# Patient Record
Sex: MALE | Race: WHITE
[De-identification: names, ages, dates, MRNs, and addresses within clinical notes are randomized per-mention and may not be internally consistent; named-entity substitution may affect disease eponyms.]

---

## 2017-01-03 ENCOUNTER — HOSPITAL ENCOUNTER (INPATIENT)
Dept: HOSPITAL 47 - 3MHU | Age: 57
LOS: 15 days | Discharge: HOME | DRG: 885 | End: 2017-01-18
Payer: MEDICARE

## 2017-01-03 VITALS — BODY MASS INDEX: 27.3 KG/M2

## 2017-01-03 DIAGNOSIS — F17.210: ICD-10-CM

## 2017-01-03 DIAGNOSIS — B19.20: ICD-10-CM

## 2017-01-03 DIAGNOSIS — F10.10: ICD-10-CM

## 2017-01-03 DIAGNOSIS — F90.2: ICD-10-CM

## 2017-01-03 DIAGNOSIS — M54.12: ICD-10-CM

## 2017-01-03 DIAGNOSIS — G89.29: ICD-10-CM

## 2017-01-03 DIAGNOSIS — F41.9: ICD-10-CM

## 2017-01-03 DIAGNOSIS — F31.10: Primary | ICD-10-CM

## 2017-01-03 DIAGNOSIS — Z79.899: ICD-10-CM

## 2017-01-03 DIAGNOSIS — F43.10: ICD-10-CM

## 2017-01-03 DIAGNOSIS — F12.20: ICD-10-CM

## 2017-01-03 DIAGNOSIS — Z91.14: ICD-10-CM

## 2017-01-03 DIAGNOSIS — G25.71: ICD-10-CM

## 2017-01-03 DIAGNOSIS — M54.9: ICD-10-CM

## 2017-01-03 DIAGNOSIS — G47.00: ICD-10-CM

## 2017-01-03 PROCEDURE — 85025 COMPLETE CBC W/AUTO DIFF WBC: CPT

## 2017-01-03 PROCEDURE — 80053 COMPREHEN METABOLIC PANEL: CPT

## 2017-01-03 PROCEDURE — 84443 ASSAY THYROID STIM HORMONE: CPT

## 2017-01-03 PROCEDURE — 81001 URINALYSIS AUTO W/SCOPE: CPT

## 2017-01-03 PROCEDURE — 80306 DRUG TEST PRSMV INSTRMNT: CPT

## 2017-01-03 PROCEDURE — 80178 ASSAY OF LITHIUM: CPT

## 2017-01-03 PROCEDURE — 93005 ELECTROCARDIOGRAM TRACING: CPT

## 2017-01-03 PROCEDURE — 94640 AIRWAY INHALATION TREATMENT: CPT

## 2017-01-03 RX ADMIN — NICOTINE SCH: 14 PATCH, EXTENDED RELEASE TRANSDERMAL at 22:42

## 2017-01-04 RX ADMIN — NICOTINE SCH PATCH: 14 PATCH, EXTENDED RELEASE TRANSDERMAL at 08:37

## 2017-01-04 RX ADMIN — ZIPRASIDONE HCL SCH MG: 20 CAPSULE ORAL at 17:30

## 2017-01-04 RX ADMIN — ALBUTEROL SULFATE PRN PUFF: 90 AEROSOL, METERED RESPIRATORY (INHALATION) at 18:56

## 2017-01-04 RX ADMIN — ACETAMINOPHEN PRN MG: 325 TABLET, FILM COATED ORAL at 20:53

## 2017-01-04 RX ADMIN — NICOTINE SCH: 14 PATCH, EXTENDED RELEASE TRANSDERMAL at 08:35

## 2017-01-04 RX ADMIN — ACETAMINOPHEN PRN MG: 325 TABLET, FILM COATED ORAL at 04:54

## 2017-01-04 RX ADMIN — HYDROCODONE BITARTRATE AND ACETAMINOPHEN PRN EACH: 7.5; 325 TABLET ORAL at 16:37
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
## 2017-01-04 NOTE — HP
HISTORY OF PRESENT ILLNESS: This is a 56-year-old  male who 

presented to the emergency department with worsening depression. 

Patient current is denying chest pain, shortness of breath, nausea, 

vomiting, abdominal pain, dizziness, (      ) or blurry vision. 



REVIEW OF SYSTEMS: All 14 systems reviewed and negative except as above.



ALLERGIES: PENICILLIN GIVES HIM A RASH.



HOME MEDICATIONS: 

1. Zetia 10 mg twice daily.

2. Marijuana 2 g daily.



SOCIAL HISTORY: The patient smokes 1 to 1-1/2 packs per day, drinks 

alcohol occasionally. Denied history of drug abuse, but stated that he 

smokes medical marijuana for his back pain.  



PAST MEDICAL HISTORY: Chronic back pain, cervical radiculopathy.



PAST SURGICAL HISTORY: Laminectomy of L5-S1 and subsequent in her 

cervical spine. 



FAMILY HISTORY: Positive for diabetes in his father, mother, brother 

and sisters. 



PHYSICAL EXAMINATION:

VITAL SIGNS: Reviewed and stable.

LUNGS: Clear to auscultation bilaterally.

HEART: S1 and S2.

HEENT: Atraumatic, normocephalic. PERRLA.

NECK: Supple. No masses. No thyromegaly.

ABDOMEN: Soft, nontender. Positive bowel sounds in all 4 quadrants. 

LOWER EXTREMITIES: No edema.

PSYCH: Alert and oriented x3.

NEURO: No focal deficit.



IMAGING AND LABS: Reviewed.



ASSESSMENT AND PLAN:

1. Acute depression with anxiety. Medications per your recommendation.

2. Chronic back pain and chronic neck pain. Patient said that when he 

is off medical marijuana, he is to be on Vicodin or equivalent. I 

would like to start the patient on Norco 7.5 mg twice daily as needed. 

3. Tobacco dependency. The patient counseled regarding smoking 

cessation and need for him to quit. 

4. Bipolar disorder per your recommendation.

## 2017-01-05 LAB
ALP SERPL-CCNC: 83 U/L (ref 38–126)
ALT SERPL-CCNC: 53 U/L (ref 21–72)
ANION GAP SERPL CALC-SCNC: 11 MMOL/L
AST SERPL-CCNC: 40 U/L (ref 17–59)
BASOPHILS # BLD AUTO: 0 K/UL (ref 0–0.2)
BASOPHILS NFR BLD AUTO: 1 %
BUN SERPL-SCNC: 12 MG/DL (ref 9–20)
CALCIUM SPEC-MCNC: 9.1 MG/DL (ref 8.4–10.2)
CH: 32.9
CHCM: 33.3
CHLORIDE SERPL-SCNC: 106 MMOL/L (ref 98–107)
CO2 SERPL-SCNC: 26 MMOL/L (ref 22–30)
EOSINOPHIL # BLD AUTO: 0.2 K/UL (ref 0–0.7)
EOSINOPHIL NFR BLD AUTO: 2 %
ERYTHROCYTE [DISTWIDTH] IN BLOOD BY AUTOMATED COUNT: 4.48 M/UL (ref 4.3–5.9)
ERYTHROCYTE [DISTWIDTH] IN BLOOD: 12.4 % (ref 11.5–15.5)
GLUCOSE SERPL-MCNC: 127 MG/DL (ref 74–99)
HCT VFR BLD AUTO: 44.4 % (ref 39–53)
HDW: 2.31
HGB BLD-MCNC: 14.4 GM/DL (ref 13–17.5)
LUC NFR BLD AUTO: 2 %
LYMPHOCYTES # SPEC AUTO: 1.5 K/UL (ref 1–4.8)
LYMPHOCYTES NFR SPEC AUTO: 21 %
MCH RBC QN AUTO: 32.1 PG (ref 25–35)
MCHC RBC AUTO-ENTMCNC: 32.3 G/DL (ref 31–37)
MCV RBC AUTO: 99.3 FL (ref 80–100)
MONOCYTES # BLD AUTO: 0.4 K/UL (ref 0–1)
MONOCYTES NFR BLD AUTO: 6 %
NEUTROPHILS # BLD AUTO: 4.9 K/UL (ref 1.3–7.7)
NEUTROPHILS NFR BLD AUTO: 68 %
NON-AFRICAN AMERICAN GFR(MDRD): >60
POTASSIUM SERPL-SCNC: 4.2 MMOL/L (ref 3.5–5.1)
PROT SERPL-MCNC: 6.6 G/DL (ref 6.3–8.2)
SODIUM SERPL-SCNC: 143 MMOL/L (ref 137–145)
WBC # BLD AUTO: 0.13 10*3/UL
WBC # BLD AUTO: 7.2 K/UL (ref 3.8–10.6)
WBC (PEROX): 7.67

## 2017-01-05 RX ADMIN — ZIPRASIDONE HCL SCH MG: 20 CAPSULE ORAL at 07:54

## 2017-01-05 RX ADMIN — NICOTINE SCH PATCH: 14 PATCH, EXTENDED RELEASE TRANSDERMAL at 08:04

## 2017-01-05 RX ADMIN — ALBUTEROL SULFATE PRN PUFF: 90 AEROSOL, METERED RESPIRATORY (INHALATION) at 16:33

## 2017-01-05 RX ADMIN — ALBUTEROL SULFATE PRN PUFF: 90 AEROSOL, METERED RESPIRATORY (INHALATION) at 21:22

## 2017-01-05 RX ADMIN — HYDROCODONE BITARTRATE AND ACETAMINOPHEN PRN EACH: 7.5; 325 TABLET ORAL at 21:07

## 2017-01-05 RX ADMIN — HYDROCODONE BITARTRATE AND ACETAMINOPHEN PRN EACH: 7.5; 325 TABLET ORAL at 05:00

## 2017-01-05 RX ADMIN — ZIPRASIDONE HCL SCH MG: 20 CAPSULE ORAL at 17:49

## 2017-01-05 RX ADMIN — ALBUTEROL SULFATE PRN PUFF: 90 AEROSOL, METERED RESPIRATORY (INHALATION) at 13:10

## 2017-01-05 RX ADMIN — ACETAMINOPHEN PRN MG: 325 TABLET, FILM COATED ORAL at 16:27

## 2017-01-05 RX ADMIN — BENZTROPINE MESYLATE PRN MG: 1 TABLET ORAL at 15:37
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
## 2017-01-06 RX ADMIN — ACETAMINOPHEN PRN MG: 325 TABLET, FILM COATED ORAL at 17:55

## 2017-01-06 RX ADMIN — BENZTROPINE MESYLATE PRN MG: 1 TABLET ORAL at 18:54

## 2017-01-06 RX ADMIN — ALBUTEROL SULFATE PRN PUFF: 90 AEROSOL, METERED RESPIRATORY (INHALATION) at 15:17

## 2017-01-06 RX ADMIN — ZIPRASIDONE HCL SCH MG: 20 CAPSULE ORAL at 08:51

## 2017-01-06 RX ADMIN — PALIPERIDONE SCH MG: 3 TABLET, EXTENDED RELEASE ORAL at 12:26

## 2017-01-06 RX ADMIN — ALBUTEROL SULFATE PRN PUFF: 90 AEROSOL, METERED RESPIRATORY (INHALATION) at 19:39

## 2017-01-06 RX ADMIN — NICOTINE SCH PATCH: 14 PATCH, EXTENDED RELEASE TRANSDERMAL at 08:51

## 2017-01-06 RX ADMIN — HYDROCODONE BITARTRATE AND ACETAMINOPHEN PRN EACH: 7.5; 325 TABLET ORAL at 21:01

## 2017-01-06 RX ADMIN — ALBUTEROL SULFATE PRN PUFF: 90 AEROSOL, METERED RESPIRATORY (INHALATION) at 09:36

## 2017-01-06 RX ADMIN — HYDROCODONE BITARTRATE AND ACETAMINOPHEN PRN EACH: 7.5; 325 TABLET ORAL at 14:55

## 2017-01-06 RX ADMIN — ACETAMINOPHEN PRN MG: 325 TABLET, FILM COATED ORAL at 23:35
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
## 2017-01-07 RX ADMIN — PALIPERIDONE SCH MG: 3 TABLET, EXTENDED RELEASE ORAL at 09:14

## 2017-01-07 RX ADMIN — HYDROCODONE BITARTRATE AND ACETAMINOPHEN PRN EACH: 7.5; 325 TABLET ORAL at 11:30

## 2017-01-07 RX ADMIN — ACETAMINOPHEN PRN MG: 325 TABLET, FILM COATED ORAL at 22:33

## 2017-01-07 RX ADMIN — NICOTINE SCH PATCH: 14 PATCH, EXTENDED RELEASE TRANSDERMAL at 09:14

## 2017-01-07 RX ADMIN — ALBUTEROL SULFATE PRN PUFF: 90 AEROSOL, METERED RESPIRATORY (INHALATION) at 12:12

## 2017-01-07 RX ADMIN — ALBUTEROL SULFATE PRN PUFF: 90 AEROSOL, METERED RESPIRATORY (INHALATION) at 08:49

## 2017-01-07 RX ADMIN — ALBUTEROL SULFATE PRN PUFF: 90 AEROSOL, METERED RESPIRATORY (INHALATION) at 17:12
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
## 2017-01-08 RX ADMIN — ALBUTEROL SULFATE PRN PUFF: 90 AEROSOL, METERED RESPIRATORY (INHALATION) at 17:03

## 2017-01-08 RX ADMIN — PALIPERIDONE SCH MG: 3 TABLET, EXTENDED RELEASE ORAL at 09:16

## 2017-01-08 RX ADMIN — ALBUTEROL SULFATE PRN PUFF: 90 AEROSOL, METERED RESPIRATORY (INHALATION) at 21:21

## 2017-01-08 RX ADMIN — ALBUTEROL SULFATE PRN PUFF: 90 AEROSOL, METERED RESPIRATORY (INHALATION) at 09:31

## 2017-01-08 RX ADMIN — HYDROCODONE BITARTRATE AND ACETAMINOPHEN PRN EACH: 7.5; 325 TABLET ORAL at 01:28

## 2017-01-08 RX ADMIN — ACETAMINOPHEN PRN MG: 325 TABLET, FILM COATED ORAL at 09:28

## 2017-01-08 RX ADMIN — HYDROCODONE BITARTRATE AND ACETAMINOPHEN PRN EACH: 7.5; 325 TABLET ORAL at 21:49

## 2017-01-08 RX ADMIN — NICOTINE SCH PATCH: 14 PATCH, EXTENDED RELEASE TRANSDERMAL at 09:15

## 2017-01-08 RX ADMIN — HYDROCODONE BITARTRATE AND ACETAMINOPHEN PRN EACH: 7.5; 325 TABLET ORAL at 10:54

## 2017-01-08 RX ADMIN — ACETAMINOPHEN PRN MG: 325 TABLET, FILM COATED ORAL at 14:42
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
## 2017-01-09 RX ADMIN — HYDROCODONE BITARTRATE AND ACETAMINOPHEN PRN EACH: 7.5; 325 TABLET ORAL at 20:53

## 2017-01-09 RX ADMIN — NICOTINE SCH PATCH: 14 PATCH, EXTENDED RELEASE TRANSDERMAL at 09:09

## 2017-01-09 RX ADMIN — PALIPERIDONE SCH MG: 3 TABLET, EXTENDED RELEASE ORAL at 09:09

## 2017-01-09 RX ADMIN — ALBUTEROL SULFATE PRN PUFF: 90 AEROSOL, METERED RESPIRATORY (INHALATION) at 17:08

## 2017-01-09 RX ADMIN — ALBUTEROL SULFATE PRN PUFF: 90 AEROSOL, METERED RESPIRATORY (INHALATION) at 09:23

## 2017-01-09 RX ADMIN — ALBUTEROL SULFATE PRN PUFF: 90 AEROSOL, METERED RESPIRATORY (INHALATION) at 21:19

## 2017-01-09 RX ADMIN — ALBUTEROL SULFATE PRN PUFF: 90 AEROSOL, METERED RESPIRATORY (INHALATION) at 13:54

## 2017-01-09 RX ADMIN — BENZTROPINE MESYLATE PRN MG: 1 TABLET ORAL at 03:59

## 2017-01-09 RX ADMIN — ACETAMINOPHEN PRN MG: 325 TABLET, FILM COATED ORAL at 06:26

## 2017-01-09 RX ADMIN — HYDROCODONE BITARTRATE AND ACETAMINOPHEN PRN EACH: 7.5; 325 TABLET ORAL at 09:09
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
## 2017-01-10 LAB
PARTICLE COUNT: 1194
PH UR: 6 [PH] (ref 5–8)
RBC UR QL: 7 /HPF (ref 0–5)
SP GR UR: 1.02 (ref 1–1.03)
SQUAMOUS UR QL AUTO: <1 /HPF (ref 0–4)
UA BILLING (MACRO VS. MICRO): (no result)
UROBILINOGEN UR QL STRIP: <2 MG/DL (ref ?–2)
WBC #/AREA URNS HPF: 1 /HPF (ref 0–5)

## 2017-01-10 RX ADMIN — PALIPERIDONE SCH MG: 6 TABLET, EXTENDED RELEASE ORAL at 09:28

## 2017-01-10 RX ADMIN — HYDROCODONE BITARTRATE AND ACETAMINOPHEN PRN EACH: 7.5; 325 TABLET ORAL at 09:28

## 2017-01-10 RX ADMIN — ALBUTEROL SULFATE PRN PUFF: 90 AEROSOL, METERED RESPIRATORY (INHALATION) at 08:57

## 2017-01-10 RX ADMIN — ALBUTEROL SULFATE PRN PUFF: 90 AEROSOL, METERED RESPIRATORY (INHALATION) at 20:46

## 2017-01-10 RX ADMIN — ALBUTEROL SULFATE PRN PUFF: 90 AEROSOL, METERED RESPIRATORY (INHALATION) at 16:55

## 2017-01-10 RX ADMIN — HYDROCODONE BITARTRATE AND ACETAMINOPHEN PRN EACH: 7.5; 325 TABLET ORAL at 17:10

## 2017-01-10 RX ADMIN — NICOTINE SCH PATCH: 14 PATCH, EXTENDED RELEASE TRANSDERMAL at 09:28

## 2017-01-10 RX ADMIN — ACETAMINOPHEN PRN MG: 325 TABLET, FILM COATED ORAL at 06:57

## 2017-01-10 RX ADMIN — ALBUTEROL SULFATE PRN PUFF: 90 AEROSOL, METERED RESPIRATORY (INHALATION) at 12:22

## 2017-01-10 RX ADMIN — ACETAMINOPHEN PRN MG: 325 TABLET, FILM COATED ORAL at 16:06

## 2017-01-10 NOTE — P.PN
Progress Note - Text


CLINICAL PROBLEMS: He was without complaint or concern.  He denied side effects 

to the increased dose of Invega.  Therapy staff reported that he is less 

hyperverbal and less restless during therapeutic groups and activities.





24 HOUR EVENTS:  He has participated in therapeutic groups and activities.  He 

posed no management problem and displayed no episodes of behavioral dyscontrol.

  





EXAMINATION:  He presented as casually groomed elderly  male with long 

hair and earrings.  He was pleasant on approach and maintained eye contact.  He 

appeared to attend to the interview.  He had a depressed facial expression.  He 

showed no abnormality of psychomotor activity.  Speech was spontaneous with 

normal rate, rhythm and volume.  His speech was not pressured and he displayed 

no flight of ideas.  His affect was depressed but stable and appropriate.  He 

denied suicidal ideation or death wishes.  He denied ideas of reference and did 

not express paranoid ideation.  His thinking was concrete and associations were 

coherent and logical.  He denied hallucinations did not appear to be responding 

to internal stimuli.





PERTINENT DATA: He is been compliant with prescribed medications.





ASSESSMENT:  He is much less agitated, labile and angry.  He showed a moderate 

response to treatment and no side effects to the increased dose of the Invega.





PLAN: Continue Invega 6 mg by mouth daily and continued discussion to position 

to long-acting injectable, encourage continued participation in therapeutic 

groups and activities, evaluate clinical status response to treatment on a 

daily basis.

## 2017-01-11 RX ADMIN — HYDROCODONE BITARTRATE AND ACETAMINOPHEN PRN EACH: 7.5; 325 TABLET ORAL at 09:04

## 2017-01-11 RX ADMIN — NICOTINE SCH: 14 PATCH, EXTENDED RELEASE TRANSDERMAL at 09:08

## 2017-01-11 RX ADMIN — PALIPERIDONE SCH MG: 6 TABLET, EXTENDED RELEASE ORAL at 09:01

## 2017-01-11 RX ADMIN — ALBUTEROL SULFATE PRN PUFF: 90 AEROSOL, METERED RESPIRATORY (INHALATION) at 12:42

## 2017-01-11 RX ADMIN — HYDROCODONE BITARTRATE AND ACETAMINOPHEN PRN EACH: 7.5; 325 TABLET ORAL at 16:38

## 2017-01-11 RX ADMIN — HYDROCODONE BITARTRATE AND ACETAMINOPHEN PRN EACH: 7.5; 325 TABLET ORAL at 21:01

## 2017-01-11 RX ADMIN — ALBUTEROL SULFATE PRN PUFF: 90 AEROSOL, METERED RESPIRATORY (INHALATION) at 09:23

## 2017-01-11 RX ADMIN — HYDROCODONE BITARTRATE AND ACETAMINOPHEN PRN EACH: 7.5; 325 TABLET ORAL at 00:39

## 2017-01-11 RX ADMIN — ALBUTEROL SULFATE PRN PUFF: 90 AEROSOL, METERED RESPIRATORY (INHALATION) at 21:36

## 2017-01-11 NOTE — P.PN
Progress Note - Text


CLINICAL PROBLEMS: He is a 56-year-old male admitted to the psychiatric unit 

involuntarily (on an ongoing treatment order) with recurrence of a manic 

psychosis.  He is complaining of back pain and requested a prescription of a 

"stronger" pain medicine than Narco.  He stated that he smokes marijuana home 

to control the pain and "unfortunately" does not have access to marijuana 

presently.  I explained that Narco is the only opiate pain medication is 

available on the psychiatric unit.  He declined my offer for a lidocaine patch.





24 HOUR EVENTS:  According to nursing reports she did not sleep last night.  

The therapist report that his he is much less labile and talkative than on 

admission but appears depressed.





EXAMINATION:  He presented as a somewhat disheveled appearing elderly  

male who was pleasant on approach.  He maintained eye contact and attended the 

interview.  Other than multiple tattoos he had no distinguishing features.  He 

had a blunted and depressed facial expression.  He was alert and oriented to 

person, place and time.  He did not show abnormality of psychomotor activity.  

He had a slow but steady gait.  His speech was spontaneous with normal rate, 

rhythm and volume.  His affect was depressed but not inappropriate or intense.  

He denied suicidal ideation or death wishes.  He denied homicidal ideation.  He 

denied depressive cognitions such as hopelessness, helplessness or 

worthlessness.  He denied obsessive about his ex-wife, his son and his daughter-

in-law.  He denied ideas of reference and did not express paranoid ideation.  

His thinking was concrete but his associations are coherent and logical.  He 

denied hallucinations and did not appear to be responding to internal stimuli





PERTINENT DATA: He again declined my recommendation to begin Invega Sustenna.





ASSESSMENT:  He is much less agitated, irritable and intrusive than on 

admission.  He is not hyperverbal or demonstrating flight of ideas.  He appears 

somewhat depressed but is denying depressive cognitions.





PLAN: Continue Invega 6 mg per day, continue to discuss transition to Invega 

Sustenna, social work will continue to try to arrange a family meeting, 

continue participation in therapeutic groups and activities, evaluate clinical 

status response to treatment daily basis.

## 2017-01-12 RX ADMIN — ALBUTEROL SULFATE PRN PUFF: 90 AEROSOL, METERED RESPIRATORY (INHALATION) at 16:50

## 2017-01-12 RX ADMIN — HYDROCODONE BITARTRATE AND ACETAMINOPHEN PRN EACH: 7.5; 325 TABLET ORAL at 20:06

## 2017-01-12 RX ADMIN — HYDROCODONE BITARTRATE AND ACETAMINOPHEN PRN EACH: 7.5; 325 TABLET ORAL at 07:56

## 2017-01-12 RX ADMIN — ALBUTEROL SULFATE PRN PUFF: 90 AEROSOL, METERED RESPIRATORY (INHALATION) at 09:59

## 2017-01-12 RX ADMIN — ALBUTEROL SULFATE PRN PUFF: 90 AEROSOL, METERED RESPIRATORY (INHALATION) at 13:53

## 2017-01-12 RX ADMIN — NICOTINE SCH: 14 PATCH, EXTENDED RELEASE TRANSDERMAL at 07:57

## 2017-01-12 RX ADMIN — ALBUTEROL SULFATE PRN PUFF: 90 AEROSOL, METERED RESPIRATORY (INHALATION) at 21:33

## 2017-01-12 RX ADMIN — PALIPERIDONE SCH MG: 6 TABLET, EXTENDED RELEASE ORAL at 07:57

## 2017-01-12 RX ADMIN — HYDROCODONE BITARTRATE AND ACETAMINOPHEN PRN EACH: 7.5; 325 TABLET ORAL at 14:26

## 2017-01-12 NOTE — P.PN
Progress Note - Text


CLINICAL PROBLEMS: He is an elderly man who has history of bipolar disorder and 

noncompliance with medication.  He presented to unit with signs and symptoms of 

acute manic episode.





He approached me several times about discharge.  He alleged that he is a small 

dog at home and does not have anyone to assist in the animals care.  He became 

angry when I suggested that we contact his son or daughter-in-law.





We discuss transitioning to Invega Sustenna.  He was at first reluctant didn't 

agreed stating that he would take the "shots" until October 2017 when his "

court order expires."  He refused to restart lithium.





24 HOUR EVENTS:  He slept 3 hours last night.  He has been compliant with 

prescribed psychotropic medications.  He is posed no management problem and 

demonstrated no behavioral dyscontrol.





EXAMINATION:  He presented as a somewhat disheveled appearing 56-year-old man 

with long hair and earrings.  He was pleasant on approach and maintained eye 

contact.  He was distractible.  He had a labile facial expression.  He was able 

to sit during the interview.  He displayed no abnormal movements.  He was 

hyperverbal.  His affect was labile; at times irritable, elevated or depressed.

  He cried intermittently during the interview.  He denied suicidal ideation or 

death wishes.  He denied homicidal ideation towards his son and daughter-in-

law.  He denied depressive cognitions such as hopelessness, helplessness and 

worthlessness.  He denied ideas reference and did not express paranoid 

ideation.  His thinking is concrete and his associations are not coherent or 

logical.  He demonstrated flight of ideas and tangentiality.  He did not 

demonstrate clang associations or neologisms.  He denied hallucinations and did 

not appear to responding to internal stimuli.





PERTINENT DATA: He is attending therapeutic groups and activities but the 

therapist describes him as disorganized, ruminative and restless.  Social 

worker has been unable to reach his family. 





ASSESSMENT:  He is shown moderate improvement since admission with a decrease 

in overall level of hyperactivity.  He is less irritable but has continued 

signs and symptoms of coleen.





PLAN: Continue paliperidone 6 mg daily.  Begin the standard Invega Sustenna 

titration on 01/13/2017 then discontinue oral paliperidon.  Continue 

participation in therapeutic groups and activities.  Evaluate clinical status 

and response to treatment on a daily basis.

## 2017-01-13 RX ADMIN — HYDROCODONE BITARTRATE AND ACETAMINOPHEN PRN EACH: 7.5; 325 TABLET ORAL at 12:59

## 2017-01-13 RX ADMIN — HYDROCODONE BITARTRATE AND ACETAMINOPHEN PRN EACH: 7.5; 325 TABLET ORAL at 20:27

## 2017-01-13 RX ADMIN — ACETAMINOPHEN PRN MG: 325 TABLET, FILM COATED ORAL at 17:13

## 2017-01-13 RX ADMIN — PALIPERIDONE SCH MG: 6 TABLET, EXTENDED RELEASE ORAL at 09:50

## 2017-01-13 RX ADMIN — HYDROCODONE BITARTRATE AND ACETAMINOPHEN PRN EACH: 7.5; 325 TABLET ORAL at 06:49

## 2017-01-13 RX ADMIN — ALBUTEROL SULFATE PRN PUFF: 90 AEROSOL, METERED RESPIRATORY (INHALATION) at 08:00

## 2017-01-13 RX ADMIN — NICOTINE SCH: 14 PATCH, EXTENDED RELEASE TRANSDERMAL at 09:50

## 2017-01-13 RX ADMIN — ALBUTEROL SULFATE PRN PUFF: 90 AEROSOL, METERED RESPIRATORY (INHALATION) at 11:52

## 2017-01-13 NOTE — P.PN
Progress Note - Text


CLINICAL PROBLEMS: He is an elderly man who has history of bipolar disorder and 

noncompliance with medication.  He presented to unit with signs and symptoms of 

acute manic episode.





He is worried about his dog.  He allegedly left it at his apartment 

unsupervised with a large part of a thought food.  He is opposed to us 

contacting his son or daughter-in-law to care for the animal.  However he 

called his landlady and learned that landlady during neighbor had been caring 

for the animal.





He remains preoccupied on discharge and is on the impression that he'll be 

discharged this coming Monday.  I told him that he may be discharged next week 

but definitely  on Monday.





24 HOUR EVENTS:  He slept 4 hours last night.  He has been compliant with 

prescribed psychotropic medications.  He is posed no management problem and 

demonstrated no behavioral dyscontrol.





EXAMINATION:  He presented as a restless 56-year-old man with long hair and 

earrings.  Akathetic movements were most pronounced when he was standing still.

  He was pleasant on approach and maintained eye contact.  He was distractible.

  He had a labile facial expression.  He was able to sit during the interview.  

He displayed no abnormal movements.  He was hyperverbal.  His affect was not 

labile. He denied suicidal ideation or death wishes.  He denied homicidal 

ideation towards his son and daughter-in-law.  He denied depressive cognitions 

such as hopelessness, helplessness and worthlessness.  He denied ideas 

reference and did not express paranoid ideation.  His thinking is concrete and 

his associations are not coherent or logical.  He demonstrated flight of ideas 

and tangentiality.  He did not demonstrate clang associations or neologisms.  

He denied hallucinations and did not appear to responding to internal stimuli.





PERTINENT DATA: He is attending therapeutic groups and activities but the 

therapist describes him as disorganized, ruminative and restless.  Social 

worker has been unable to reach his family. 





ASSESSMENT:  He is shown moderate improvement since admission with a decrease 

in overall level of hyperactivity and gradual improvement sleep.  He is 

developed akathisia with the 6 mg dose of paliperidone





PLAN: Decrease the paliperidone to 3 mg daily.  Hold  Invega Sustenna until the 

akathisia diminishes.  Continue participation in therapeutic groups and 

activities.  Evaluate clinical status and response to treatment on a daily basis

## 2017-01-14 RX ADMIN — HYDROCODONE BITARTRATE AND ACETAMINOPHEN PRN EACH: 7.5; 325 TABLET ORAL at 13:15

## 2017-01-14 RX ADMIN — ALBUTEROL SULFATE PRN PUFF: 90 AEROSOL, METERED RESPIRATORY (INHALATION) at 21:14

## 2017-01-14 RX ADMIN — PALIPERIDONE SCH MG: 3 TABLET, EXTENDED RELEASE ORAL at 09:12

## 2017-01-14 RX ADMIN — NICOTINE SCH: 14 PATCH, EXTENDED RELEASE TRANSDERMAL at 09:12

## 2017-01-14 RX ADMIN — HYDROCODONE BITARTRATE AND ACETAMINOPHEN PRN EACH: 7.5; 325 TABLET ORAL at 19:21

## 2017-01-14 RX ADMIN — ALBUTEROL SULFATE PRN PUFF: 90 AEROSOL, METERED RESPIRATORY (INHALATION) at 15:25

## 2017-01-14 RX ADMIN — HYDROCODONE BITARTRATE AND ACETAMINOPHEN PRN EACH: 7.5; 325 TABLET ORAL at 05:23

## 2017-01-14 NOTE — P.PN
Progress Note - Text





Interval history: The patient is found in the hallway he follows me to an 

interview room.  He is being seen today in coverage for Dr. Anderson.  The 

patient presents with a history of bipolar disorder and is obviously manic with 

symptoms of psychosis.  The patient lacks insight into the symptoms.  It 

appears he is being currently managed with invega and the dosage has just been 

reduced from 6 mg to 3 mg.  This was due to concerns related to akathisia.  The 

patient states that he is being held here for no reason and wonders why "you 

are doing this to me".  Numerous times he states he does not have bipolar 

disorder and does not require any psychiatric medication.





Mental status exam: The patient is an alert  male.  He has long hair 

he is wearing an earring.  He appears to be wearing a women's blouse and his 

own pants.  He has a disheveled appearance.  Speech is pressured he is 

hyperverbal, thought process is tangential he demonstrates loose associations.  

He appears to have a grandiose thought content and expresses feelings of 

paranoia and persecution from the clinical staff.  Numerous times he challenges 

me for diagnosing him with bipolar disorder.  Insight and judgment are 

impaired.  He demonstrates no verbal or physical aggressiveness.  He is able to 

remain seated in the chair during the interview.





Plan: The patient will be continued on the invega as written.  Based on today's 

presentation I would consider adding another mood stabilizer possible.  He does 

state that he does not wish to be on lithium or Depakote again we will address 

other options.  I believe the patient is on a treatment order.  We will monitor 

him for safety and encourage his participation in the milieu.  Vital signs 

reviewed.

## 2017-01-15 RX ADMIN — HYDROCODONE BITARTRATE AND ACETAMINOPHEN PRN EACH: 7.5; 325 TABLET ORAL at 16:32

## 2017-01-15 RX ADMIN — HYDROCODONE BITARTRATE AND ACETAMINOPHEN PRN EACH: 7.5; 325 TABLET ORAL at 23:15

## 2017-01-15 RX ADMIN — BENZTROPINE MESYLATE PRN MG: 1 TABLET ORAL at 08:44

## 2017-01-15 RX ADMIN — ACETAMINOPHEN PRN MG: 325 TABLET, FILM COATED ORAL at 13:51

## 2017-01-15 RX ADMIN — ALBUTEROL SULFATE PRN PUFF: 90 AEROSOL, METERED RESPIRATORY (INHALATION) at 09:26

## 2017-01-15 RX ADMIN — ALBUTEROL SULFATE PRN PUFF: 90 AEROSOL, METERED RESPIRATORY (INHALATION) at 20:41

## 2017-01-15 RX ADMIN — PALIPERIDONE SCH MG: 3 TABLET, EXTENDED RELEASE ORAL at 08:39

## 2017-01-15 RX ADMIN — ALBUTEROL SULFATE PRN PUFF: 90 AEROSOL, METERED RESPIRATORY (INHALATION) at 14:41

## 2017-01-15 RX ADMIN — BENZTROPINE MESYLATE PRN MG: 1 TABLET ORAL at 23:15

## 2017-01-15 RX ADMIN — NICOTINE SCH: 14 PATCH, EXTENDED RELEASE TRANSDERMAL at 08:39

## 2017-01-15 RX ADMIN — HYDROCODONE BITARTRATE AND ACETAMINOPHEN PRN EACH: 7.5; 325 TABLET ORAL at 08:41
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
## 2017-01-16 RX ADMIN — NICOTINE SCH PATCH: 14 PATCH, EXTENDED RELEASE TRANSDERMAL at 09:25

## 2017-01-16 RX ADMIN — ACETAMINOPHEN PRN ML: 160 SOLUTION ORAL at 22:07

## 2017-01-16 RX ADMIN — BENZTROPINE MESYLATE PRN MG: 1 TABLET ORAL at 20:08

## 2017-01-16 RX ADMIN — PALIPERIDONE SCH MG: 3 TABLET, EXTENDED RELEASE ORAL at 09:25

## 2017-01-16 RX ADMIN — ACETAMINOPHEN PRN MG: 325 TABLET, FILM COATED ORAL at 16:16

## 2017-01-16 RX ADMIN — HYDROCODONE BITARTRATE AND ACETAMINOPHEN PRN EACH: 7.5; 325 TABLET ORAL at 07:50

## 2017-01-16 RX ADMIN — HYDROCODONE BITARTRATE AND ACETAMINOPHEN PRN EACH: 7.5; 325 TABLET ORAL at 14:37

## 2017-01-16 RX ADMIN — ALBUTEROL SULFATE PRN PUFF: 90 AEROSOL, METERED RESPIRATORY (INHALATION) at 20:12

## 2017-01-16 RX ADMIN — BENZTROPINE MESYLATE PRN MG: 1 TABLET ORAL at 14:37

## 2017-01-16 RX ADMIN — HYDROCODONE BITARTRATE AND ACETAMINOPHEN PRN EACH: 7.5; 325 TABLET ORAL at 20:08
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
## 2017-01-17 RX ADMIN — NICOTINE SCH PATCH: 14 PATCH, EXTENDED RELEASE TRANSDERMAL at 08:49

## 2017-01-17 RX ADMIN — HYDROCODONE BITARTRATE AND ACETAMINOPHEN PRN EACH: 7.5; 325 TABLET ORAL at 15:36

## 2017-01-17 RX ADMIN — HYDROCODONE BITARTRATE AND ACETAMINOPHEN PRN EACH: 7.5; 325 TABLET ORAL at 01:54

## 2017-01-17 RX ADMIN — ACETAMINOPHEN PRN ML: 160 SOLUTION ORAL at 15:29

## 2017-01-17 RX ADMIN — ALBUTEROL SULFATE PRN PUFF: 90 AEROSOL, METERED RESPIRATORY (INHALATION) at 14:12

## 2017-01-17 RX ADMIN — HYDROCODONE BITARTRATE AND ACETAMINOPHEN PRN EACH: 7.5; 325 TABLET ORAL at 21:15

## 2017-01-17 RX ADMIN — ALBUTEROL SULFATE PRN PUFF: 90 AEROSOL, METERED RESPIRATORY (INHALATION) at 21:00

## 2017-01-17 RX ADMIN — ALBUTEROL SULFATE PRN PUFF: 90 AEROSOL, METERED RESPIRATORY (INHALATION) at 09:22

## 2017-01-17 RX ADMIN — ACETAMINOPHEN PRN ML: 160 SOLUTION ORAL at 21:59

## 2017-01-17 RX ADMIN — BENZTROPINE MESYLATE PRN MG: 1 TABLET ORAL at 10:12

## 2017-01-17 RX ADMIN — PALIPERIDONE SCH MG: 3 TABLET, EXTENDED RELEASE ORAL at 08:49

## 2017-01-17 RX ADMIN — HYDROCODONE BITARTRATE AND ACETAMINOPHEN PRN EACH: 7.5; 325 TABLET ORAL at 08:52

## 2017-01-17 RX ADMIN — ACETAMINOPHEN PRN ML: 160 SOLUTION ORAL at 11:12

## 2017-01-17 RX ADMIN — BENZTROPINE MESYLATE SCH MG: 1 TABLET ORAL at 21:14

## 2017-01-17 NOTE — P.PN
Progress Note - Text


SUBJECTIVE:  He stated that he would like to arrange a family meeting with his 

son and daughter-in-law.  He stated that they have reconciled and is no longer 

angry at them.  He denied side effects to the Abilify Sustenna injection but 

nursing reported that he appears restless and "akathetic".





OBJECTIVE:   He presented elderly male with long hair and earrings who is 

dressed in a similar manner to yesterday; wearing a short-sleeved shirt over a 

longsleeve and shorts over long pants.  He maintained eye contact and appeared 

to attend to interview.  He had a blunted but bright facial expression.  He 

appeared restless but not agitated.  His speech was spontaneous with slight 

increase in rate but normal volume and rhythm.  His affect was labile but 

appropriate.  He denied suicidal ideation or death wishes he denied depressive 

cognitions such as hopelessness, helplessness and worthlessness.  He did not 

express ideas reference or paranoid ideation.  His thinking was concrete and 

associations appeared coherent and logical.  He denied hallucinations and did 

not appear to responding to internal stimuli.





ASSESSMENT:  He has some signs and symptoms of hypomania but is much improved 

from admission.  He appears to be having some akathisia from the 156 mg dose of 

Abilify Sustenna





PLAN: Continue oral Abilify until the next systemic injection in 7 days.  

 to arrange a family meeting where we can obtain some information 

about his baseline functioning.  Change Cogentin dosing from 1 mg twice a day 

when necessary to regular dosing.  Continue  participation in therapeutic 

groups and activities.  Evaluate clinical status and response to treatment 

daily basis.  If his family reports that he is at or near his baseline 

functioning and sconsider discharge prior to the next Sustenna injection.

## 2017-01-18 VITALS
DIASTOLIC BLOOD PRESSURE: 80 MMHG | HEART RATE: 91 BPM | SYSTOLIC BLOOD PRESSURE: 123 MMHG | RESPIRATION RATE: 20 BRPM | TEMPERATURE: 98 F

## 2017-01-18 RX ADMIN — NICOTINE SCH PATCH: 14 PATCH, EXTENDED RELEASE TRANSDERMAL at 09:21

## 2017-01-18 RX ADMIN — HYDROCODONE BITARTRATE AND ACETAMINOPHEN PRN EACH: 7.5; 325 TABLET ORAL at 09:23

## 2017-01-18 RX ADMIN — PALIPERIDONE SCH MG: 3 TABLET, EXTENDED RELEASE ORAL at 09:21

## 2017-01-18 RX ADMIN — BENZTROPINE MESYLATE SCH MG: 1 TABLET ORAL at 09:21

## 2017-01-18 RX ADMIN — ALBUTEROL SULFATE PRN PUFF: 90 AEROSOL, METERED RESPIRATORY (INHALATION) at 10:50
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
## 2018-08-15 ENCOUNTER — HOSPITAL ENCOUNTER (INPATIENT)
Dept: HOSPITAL 47 - EC | Age: 58
LOS: 20 days | Discharge: HOME | DRG: 885 | End: 2018-09-04
Attending: PSYCHIATRY & NEUROLOGY | Admitting: PSYCHIATRY & NEUROLOGY
Payer: MEDICARE

## 2018-08-15 VITALS — BODY MASS INDEX: 26 KG/M2

## 2018-08-15 DIAGNOSIS — F12.20: ICD-10-CM

## 2018-08-15 DIAGNOSIS — B19.20: ICD-10-CM

## 2018-08-15 DIAGNOSIS — F43.10: ICD-10-CM

## 2018-08-15 DIAGNOSIS — F31.2: Primary | ICD-10-CM

## 2018-08-15 DIAGNOSIS — Z88.0: ICD-10-CM

## 2018-08-15 DIAGNOSIS — Z81.8: ICD-10-CM

## 2018-08-15 DIAGNOSIS — Z63.9: ICD-10-CM

## 2018-08-15 DIAGNOSIS — F90.9: ICD-10-CM

## 2018-08-15 DIAGNOSIS — Z79.899: ICD-10-CM

## 2018-08-15 DIAGNOSIS — F17.200: ICD-10-CM

## 2018-08-15 DIAGNOSIS — Z72.89: ICD-10-CM

## 2018-08-15 PROCEDURE — 99285 EMERGENCY DEPT VISIT HI MDM: CPT

## 2018-08-15 PROCEDURE — 83036 HEMOGLOBIN GLYCOSYLATED A1C: CPT

## 2018-08-15 PROCEDURE — 81001 URINALYSIS AUTO W/SCOPE: CPT

## 2018-08-15 PROCEDURE — 80183 DRUG SCRN QUANT OXCARBAZEPIN: CPT

## 2018-08-15 PROCEDURE — 84450 TRANSFERASE (AST) (SGOT): CPT

## 2018-08-15 PROCEDURE — 85025 COMPLETE CBC W/AUTO DIFF WBC: CPT

## 2018-08-15 PROCEDURE — 80074 ACUTE HEPATITIS PANEL: CPT

## 2018-08-15 PROCEDURE — 84460 ALANINE AMINO (ALT) (SGPT): CPT

## 2018-08-15 PROCEDURE — 80061 LIPID PANEL: CPT

## 2018-08-15 PROCEDURE — 80053 COMPREHEN METABOLIC PANEL: CPT

## 2018-08-15 PROCEDURE — 80306 DRUG TEST PRSMV INSTRMNT: CPT

## 2018-08-15 PROCEDURE — 82248 BILIRUBIN DIRECT: CPT

## 2018-08-15 PROCEDURE — 82075 ASSAY OF BREATH ETHANOL: CPT

## 2018-08-15 PROCEDURE — 80164 ASSAY DIPROPYLACETIC ACD TOT: CPT

## 2018-08-15 PROCEDURE — 84443 ASSAY THYROID STIM HORMONE: CPT

## 2018-08-15 RX ADMIN — ACETAMINOPHEN PRN MG: 325 TABLET, FILM COATED ORAL at 15:05

## 2018-08-15 SDOH — SOCIAL STABILITY - SOCIAL INSECURITY: PROBLEM RELATED TO PRIMARY SUPPORT GROUP, UNSPECIFIED: Z63.9
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
## 2018-08-15 NOTE — P.MDCNMH
History of Present Illness


Chief Complaint: Worsening bipolar and psychotic features





58-year-old male who has past medical history of bipolar disorder with previous 

admissions to mental health unit who was admitted here due to the worsening 

symptoms of bipolar disorder restlessness impulsiveness stating to be hearing 

voices and wanting to purge himself and the pupil in her surrounding.


Patient is a very poor historian.  Quite difficulties obtaining straightforward 

thought process and currently her answers from him.  His digressive he skips 

from topic to topic.  Generally he states that he does not have much of any 

medical problems.  He does not take any medications except for his bipolar 

disorder he denied any particular medical conditions like lung problems car 

problems.  Heart attacks strokes or diabetes or kidney problems.  Currently he 

denies any headache vision changes shortness of breath cough chest pain 

palpitations abdominal pain nausea vomiting denies any weight loss appetite 

close burning with urination or changes in his stool denies any musculoskeletal 

pain or any other focal pain or discomfort.





Review of Systems





REVIEW OF SYSTEMS:


CONSTITUTIONAL: No fever or chills


HEENT: No changes in vision or voice


CARDIOVASCULAR: no chest pain or abnormal heart beats, or any swelling in  

ankles or feet.


RESPIRATORY: No wheezing or coughing.


GASTROINTESTINAL: No abdominal pain, no nausea no vomiting no constipation or 

diarrhea


GENITOURINARY: no any urinary urgency, frequency or burning, and there has been 

no blood in her urine.  no flank pain. 


MUSCULOSKELETAL: notes full range of motion of all her joints without pain or 

swelling.


NEUROLOGICAL: , no headache.  no vision changes, or fainting. No numbness or 

tingling.


Skin: Multiple tattoos no rash no cyanosis appear


Psychiatric: Awake and alert not visit to establish level of orientation due to 

the aggressive answers, appears agitated and the moments and anxious





Past Medical History


Past Medical History: No Reported History


History of Any Multi-Drug Resistant Organisms: None Reported


Past Surgical History: Orthopedic Surgery


Additional Past Surgical History / Comment(s): 2005 broken neck, 2007 back 

fusion, 2005 acl repair


Past Anesthesia/Blood Transfusion Reactions: No Reported Reaction


Smoking Status: Current every day smoker





Medications and Allergies


 Home Medications











 Medication  Instructions  Recorded  Confirmed  Type


 


Mirtazapine [Remeron] 15 mg PO HS 08/15/18 08/15/18 History


 


OXcarbazepine [Trileptal] 300 mg PO BID 08/15/18 08/15/18 History


 


Paliperidone [Invega] 6 mg PO DAILY 08/15/18 08/15/18 History











 Allergies











Allergy/AdvReac Type Severity Reaction Status Date / Time


 


Penicillins Allergy  Unknown Verified 08/15/18 13:28





   Childhood  














Physical Exam


Vitals: 


 Vital Signs











  Temp Pulse Pulse Resp BP BP Pulse Ox


 


 08/15/18 15:00  97.8 F   77  20   130/92 


 


 08/15/18 13:32  98.5 F  75   18  136/90   97


 


 08/15/18 09:07  98.4 F  84   18  131/85   97








 Intake and Output











 08/15/18 08/15/18 08/15/18





 06:59 14:59 22:59


 


Other:   


 


  Weight  86.183 kg 74.9 kg














Vital Signs: I have reviewed the vital signs.


GENERAL: Well-nourished, Well-developed , no apparent distress, cooperative


Eyes: PERRL, extraoculry movements intact,  clear conjunctiva


Head: : Atraumatic external nose and ears,  oropharyngeal mucosa is moist 

without lesions or exudates


Neck: Symmetric, trachea midline, No thyromegaly, no masses or neck vain 

pulsation, no neck rigidity


CVS: +S1/S2, No murmurs or gallops. Peripheral pulses 2+ and equal in all 

extremities.


RESP: Unlabored respiratory effort. Clear to auscultation bilaterally.


Abdomen: Bowel sounds present in all 4 quadrants, Soft to palpation, Nontender/

Nondistended, No hepatosplenomegaly, no hernias or masses, no CVA tnderness 


Musculoskeletal:  Extremities w/o deformity, No cyanosis or clubbing, no joint 

swelling 


Skin: Warm, Dry. No rashes or lesions


Neuro: CNs II-XII grossly intact, motor strenght 5/5 i upper and lower 

extremities, no clonus, patellar DTRs 2+ and sympetrical


Psych: Awake, Alert, & Oriented (AAO) x3 Appropriate mood and affect





Cranial Nerve Examination





- Cranial Nerves


Cranial Nerve I- Olfactory: Intact


Cranial Nerve II- Optic: Intact


Cranial Nerve III- Oculomotor: Intact


Cranial Nerve IV- Trochlear: Intact


Cranial Nerve V- Trigeminal: Intact


Cranial Nerve VI- Abducens: Intact


Cranial Nerve VII- Facial: Intact


Cranial Nerve VIII- Auditory: Intact


Cranial Nerve IX- Glossopharyngeal: Intact


Cranial Nerve X- Vagus: Intact


Cranial Nerve XI- Accessory: Intact


Cranial Nerve XII- Hypoglossal: Intact





Results


Labs: 


 Abnormal Lab Results - Last 24 Hours (Table)











  08/15/18 Range/Units





  09:29 


 


U Marijuana (THC) Screen  Detected H  (NotDetected)  














Assessment and Plan


Plan: 





1.  Bipolar disorder


With decompensation


Psychiatry following


Admission mental health unit


Usual blood work with CBC CMP TSH A1c and lipid panel


Denies drinking


Denies smoking


Reports smoking marijuana


Medicine we'll continue to follow

## 2018-08-16 LAB
ALBUMIN SERPL-MCNC: 3.6 G/DL (ref 3.5–5)
ALP SERPL-CCNC: 76 U/L (ref 38–126)
ALT SERPL-CCNC: 34 U/L (ref 21–72)
ANION GAP SERPL CALC-SCNC: 3 MMOL/L
AST SERPL-CCNC: 39 U/L (ref 17–59)
BASOPHILS # BLD AUTO: 0.1 K/UL (ref 0–0.2)
BASOPHILS NFR BLD AUTO: 1 %
BILIRUB INDIRECT SERPL-MCNC: 0.1 MG/DL (ref 0–1.1)
BILIRUBIN DIRECT+TOT PNL SERPL-MCNC: 0.3 MG/DL (ref 0–0.2)
BUN SERPL-SCNC: 15 MG/DL (ref 9–20)
CALCIUM SPEC-MCNC: 8.9 MG/DL (ref 8.4–10.2)
CHLORIDE SERPL-SCNC: 108 MMOL/L (ref 98–107)
CHOLEST SERPL-MCNC: 121 MG/DL (ref ?–200)
CO2 SERPL-SCNC: 29 MMOL/L (ref 22–30)
EOSINOPHIL # BLD AUTO: 0.3 K/UL (ref 0–0.7)
EOSINOPHIL NFR BLD AUTO: 4 %
ERYTHROCYTE [DISTWIDTH] IN BLOOD BY AUTOMATED COUNT: 4.32 M/UL (ref 4.3–5.9)
ERYTHROCYTE [DISTWIDTH] IN BLOOD: 12.5 % (ref 11.5–15.5)
GLUCOSE SERPL-MCNC: 89 MG/DL (ref 74–99)
HBA1C MFR BLD: 5.2 % (ref 4–6)
HCT VFR BLD AUTO: 41.6 % (ref 39–53)
HDLC SERPL-MCNC: 43 MG/DL (ref 40–60)
HGB BLD-MCNC: 13.3 GM/DL (ref 13–17.5)
LDLC SERPL CALC-MCNC: 68 MG/DL (ref 0–99)
LYMPHOCYTES # SPEC AUTO: 1.7 K/UL (ref 1–4.8)
LYMPHOCYTES NFR SPEC AUTO: 22 %
MCH RBC QN AUTO: 30.8 PG (ref 25–35)
MCHC RBC AUTO-ENTMCNC: 32 G/DL (ref 31–37)
MCV RBC AUTO: 96.1 FL (ref 80–100)
MONOCYTES # BLD AUTO: 0.6 K/UL (ref 0–1)
MONOCYTES NFR BLD AUTO: 7 %
NEUTROPHILS # BLD AUTO: 4.9 K/UL (ref 1.3–7.7)
NEUTROPHILS NFR BLD AUTO: 65 %
PLATELET # BLD AUTO: 110 K/UL (ref 150–450)
POTASSIUM SERPL-SCNC: 4.2 MMOL/L (ref 3.5–5.1)
PROT SERPL-MCNC: 6.1 G/DL (ref 6.3–8.2)
SODIUM SERPL-SCNC: 140 MMOL/L (ref 137–145)
TRIGL SERPL-MCNC: 51 MG/DL (ref ?–150)
WBC # BLD AUTO: 7.6 K/UL (ref 3.8–10.6)

## 2018-08-16 RX ADMIN — PALIPERIDONE SCH MG: 6 TABLET, EXTENDED RELEASE ORAL at 10:46

## 2018-08-16 RX ADMIN — ACETAMINOPHEN PRN MG: 325 TABLET, FILM COATED ORAL at 16:05

## 2018-08-16 RX ADMIN — NICOTINE SCH PATCH: 14 PATCH, EXTENDED RELEASE TRANSDERMAL at 10:45

## 2018-08-16 RX ADMIN — ACETAMINOPHEN PRN MG: 325 TABLET, FILM COATED ORAL at 21:54
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
## 2018-08-17 RX ADMIN — ACETAMINOPHEN PRN MG: 325 TABLET, FILM COATED ORAL at 09:21

## 2018-08-17 RX ADMIN — ACETAMINOPHEN PRN MG: 325 TABLET, FILM COATED ORAL at 02:42

## 2018-08-17 RX ADMIN — NICOTINE SCH PATCH: 14 PATCH, EXTENDED RELEASE TRANSDERMAL at 08:47

## 2018-08-17 RX ADMIN — ACETAMINOPHEN PRN MG: 325 TABLET, FILM COATED ORAL at 14:27

## 2018-08-17 RX ADMIN — PALIPERIDONE SCH MG: 6 TABLET, EXTENDED RELEASE ORAL at 08:47
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
## 2018-08-18 RX ADMIN — ACETAMINOPHEN PRN MG: 325 TABLET, FILM COATED ORAL at 08:48

## 2018-08-18 RX ADMIN — ACETAMINOPHEN PRN MG: 325 TABLET, FILM COATED ORAL at 13:09

## 2018-08-18 RX ADMIN — ACETAMINOPHEN PRN MG: 325 TABLET, FILM COATED ORAL at 20:17

## 2018-08-18 RX ADMIN — PALIPERIDONE SCH MG: 6 TABLET, EXTENDED RELEASE ORAL at 08:01

## 2018-08-18 RX ADMIN — NICOTINE SCH PATCH: 14 PATCH, EXTENDED RELEASE TRANSDERMAL at 08:01

## 2018-08-18 RX ADMIN — ACETAMINOPHEN PRN MG: 325 TABLET, FILM COATED ORAL at 02:38

## 2018-08-18 NOTE — P.PN
Progress Note - Text


Progress Note Date: 08/18/18





Interval history: Patient seen in cross coverage today.  He says that he is 

sleeping better.  He is eating well.  He does not voice any adverse 

psychotropic medication side effects.  He is attending groups.  He relates that 

he does not have bipolar disorder, he relays he has PTSD.





Mental status exam: He is alert and cooperative with the interview.  His speech 

is fluent, not rapid or pressured.  He describes his mood is doing very well.  

He denies any thoughts of harm to self or others.  He does not show any 

agitation.  He relates that he doesn't have bipolar disorder, says he has PTSD.





Plan: Patient will be maintained on current psychotropic medication regimen.  

Continue to monitor his ongoing response to treatment monitor for any 

medication side effects.  We'll continue to cover this patient through the 

weekend.

## 2018-08-19 RX ADMIN — ACETAMINOPHEN PRN MG: 325 TABLET, FILM COATED ORAL at 22:54

## 2018-08-19 RX ADMIN — ACETAMINOPHEN PRN MG: 325 TABLET, FILM COATED ORAL at 05:04

## 2018-08-19 RX ADMIN — ACETAMINOPHEN PRN MG: 325 TABLET, FILM COATED ORAL at 00:15

## 2018-08-19 RX ADMIN — NICOTINE SCH PATCH: 14 PATCH, EXTENDED RELEASE TRANSDERMAL at 08:06

## 2018-08-19 RX ADMIN — ACETAMINOPHEN PRN MG: 325 TABLET, FILM COATED ORAL at 17:38

## 2018-08-19 RX ADMIN — PALIPERIDONE SCH MG: 6 TABLET, EXTENDED RELEASE ORAL at 08:06

## 2018-08-19 NOTE — P.PN
Progress Note - Text


Progress Note Date: 08/18/18





Interval history: Patient seen in coverage again today.  He is seen walking out 

of his room and relays that he needs to dry his face and hands.  He proceeded 

to dry off with a towel that was on the counter at the nurses station.  We 

discussed him having paper towel available in his restroom.  Patient reports 

that he slept about 5-6 hours last night.  He does not seem to voice any 

adverse psychotropic medication side effects.





Mental status exam: He is alert and cooperative with the interview.  His speech 

is fluent, pressured at times.  He describes his mood is stable, relays it's 

been stable as far back as he can remember.  He denies any thoughts of harm to 

self or others.  There is a grandiose quality.  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  He does not show any agitation.





Plan: Patient be maintained on current psychotropic medication regimen.  

Continue to monitor for any medication side effects and monitor his ongoing 

response to treatment.

## 2018-08-20 LAB
PH UR: 6.5 [PH] (ref 5–8)
RBC UR QL: 1 /HPF (ref 0–5)
SP GR UR: 1 (ref 1–1.03)
UROBILINOGEN UR QL STRIP: <2 MG/DL (ref ?–2)

## 2018-08-20 RX ADMIN — NICOTINE SCH PATCH: 21 PATCH, EXTENDED RELEASE TRANSDERMAL at 13:03

## 2018-08-20 RX ADMIN — NICOTINE SCH PATCH: 14 PATCH, EXTENDED RELEASE TRANSDERMAL at 08:35

## 2018-08-20 RX ADMIN — PALIPERIDONE SCH MG: 6 TABLET, EXTENDED RELEASE ORAL at 08:36

## 2018-08-20 RX ADMIN — ACETAMINOPHEN PRN MG: 325 TABLET, FILM COATED ORAL at 02:53

## 2018-08-20 RX ADMIN — ACETAMINOPHEN PRN MG: 325 TABLET, FILM COATED ORAL at 12:46

## 2018-08-20 RX ADMIN — ACETAMINOPHEN PRN MG: 325 TABLET, FILM COATED ORAL at 07:20

## 2018-08-20 RX ADMIN — ACETAMINOPHEN PRN MG: 325 TABLET, FILM COATED ORAL at 21:35

## 2018-08-20 RX ADMIN — ACETAMINOPHEN PRN MG: 325 TABLET, FILM COATED ORAL at 17:24
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
## 2018-08-21 RX ADMIN — ACETAMINOPHEN PRN MG: 325 TABLET, FILM COATED ORAL at 02:24

## 2018-08-21 RX ADMIN — ACETAMINOPHEN PRN MG: 325 TABLET, FILM COATED ORAL at 23:49

## 2018-08-21 RX ADMIN — ACETAMINOPHEN PRN MG: 325 TABLET, FILM COATED ORAL at 19:37

## 2018-08-21 RX ADMIN — PALIPERIDONE SCH MG: 6 TABLET, EXTENDED RELEASE ORAL at 09:12

## 2018-08-21 RX ADMIN — ACETAMINOPHEN PRN MG: 325 TABLET, FILM COATED ORAL at 06:53

## 2018-08-21 RX ADMIN — NICOTINE SCH PATCH: 21 PATCH, EXTENDED RELEASE TRANSDERMAL at 09:12

## 2018-08-21 RX ADMIN — ACETAMINOPHEN PRN MG: 325 TABLET, FILM COATED ORAL at 15:49

## 2018-08-21 NOTE — P.PN
Progress Note - Text





Interval history: The patient is found in the hallway he follows me to an 

interview room.  He states that he met with the  and signed the 

deferral agreement.  He states he will follow doctor's orders but does not 

agree with the bipolar diagnosis.  He states any man-made medications are 

placebos and he only wishes to take natural things such as marijuana.  Despite 

that believe he states he is willing to comply with a deferral agreement and in 

fact asks to take the Invega injection so that he does not need to remember to 

take a pill every day.  We discussed the initiation process of Invega Sustenna 

and his questions were answered.





Mental status exam: The patient is alert he has a disheveled appearance his 

hair is long he is malodorous.  He is dressed in his own clothing.  He soft-

spoken he does have spontaneous speech she is verbose but nonpressured.  He 

describes a variety of delusional thoughts that are often grandiose in nature.  

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  He states 

that he is able to read my or and affect it by words that he says, SANE the 

numbers 3 and 7.  Insight and judgment are impaired.  He demonstrates no verbal 

or physical aggressiveness.  He demonstrates mainly a constricted affect.





Plan: The patient will continue on the oral Invega we will initiate Invega 

Sustenna 234 mg IM today.  We will continue to monitor him for safety.  Social 

work has contacted the patient's son who plans to apply for guardianship.  

Vital signs reviewed.

## 2018-08-22 RX ADMIN — ACETAMINOPHEN PRN MG: 325 TABLET, FILM COATED ORAL at 11:44

## 2018-08-22 RX ADMIN — ACETAMINOPHEN PRN MG: 325 TABLET, FILM COATED ORAL at 04:21

## 2018-08-22 RX ADMIN — PALIPERIDONE SCH MG: 6 TABLET, EXTENDED RELEASE ORAL at 08:50

## 2018-08-22 RX ADMIN — NICOTINE SCH PATCH: 21 PATCH, EXTENDED RELEASE TRANSDERMAL at 08:50

## 2018-08-22 RX ADMIN — ACETAMINOPHEN PRN MG: 325 TABLET, FILM COATED ORAL at 15:30

## 2018-08-22 NOTE — P.PN
Progress Note - Text





Interval history: The patient is found in the hallway he follows me to an 

interview room.  The patient describes feeling agitated this morning as the 

phone was turned off while he was having a discussion.  Despite being warned 

twice that the phones were going to be turned off he feels it was wrong a staff 

to do that.  He spent most of the session discussing this issue.  He states 

that he slept 5 hours last night appetite is stable.  He continues to assert 

that he does not have a bipolar disorder but is willing to comply with 

recommendations.





Mental status exam: The patient is alert he is dressed in his own clothing he 

is wearing 2 shirts.  Eye contact is appropriate.  He has spontaneous pressured 

speech.  He demonstrates tangential thinking and loose associations.  He 

reports some paranoid thinking and persecutory thinking.  Insight and judgment 

are limited.  He is reporting no suicidal or homicidal thoughts.  He is 

endorsing no hallucinations.  He has a more irritable affect today compared to 

yesterday.





Plan: The patient continues to demonstrate symptoms of coleen and psychosis.  He 

did receive the invega systemic injection yesterday.  We'll continue his 

medications as written.  We will monitor him for safety and encourage full 

participation in the milieu.  Vital signs reviewed.

## 2018-08-23 RX ADMIN — ACETAMINOPHEN PRN MG: 325 TABLET, FILM COATED ORAL at 06:24

## 2018-08-23 RX ADMIN — PALIPERIDONE SCH MG: 6 TABLET, EXTENDED RELEASE ORAL at 08:43

## 2018-08-23 RX ADMIN — ACETAMINOPHEN PRN MG: 325 TABLET, FILM COATED ORAL at 20:17

## 2018-08-23 RX ADMIN — ACETAMINOPHEN PRN MG: 325 TABLET, FILM COATED ORAL at 00:59

## 2018-08-23 RX ADMIN — ACETAMINOPHEN PRN MG: 325 TABLET, FILM COATED ORAL at 14:30

## 2018-08-23 RX ADMIN — NICOTINE SCH PATCH: 21 PATCH, EXTENDED RELEASE TRANSDERMAL at 08:43

## 2018-08-23 NOTE — P.PN
Progress Note - Text





Interval history: The patient is found in the hallway he follows me to an 

interview room.  He reports his mood is good but he is frustrated that he is 

here.  He states he is not bipolar and does not understand why he keeps getting 

court ordered for treatment over the years.  We reviewed his psychotropic 

medications.  His questions were answered.





Mental status exam: The patient is alert he is dressed in his own clothing.  He 

has long hair he wears and beard.  He has numerous visible tattoos on his 

extremities.  He is malodorous despite the report that he is showering and 

washing his clothes.  He has spontaneous speech.  At first during this session 

his speech was nonpressured but as the session progressed he became more 

verbose and pressured.  He became more animated during his speech and was 

physically demonstrative although he demonstrated no aggressive behavior.  He 

does demonstrate tangential thinking and loose associations.  He demonstrates 

grandiose thinking in the context of Sabianist preoccupation.  He also 

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  He states that he read the books to no those roles.  Insight and 

judgment impaired.  He denies having any thoughts of harming himself or others.

  He is endorsing no hallucinations.





Plan: The patient continues to demonstrate symptoms of coleen and psychosis.  He 

is not yet due for the second dose of Invega Sustenna and we plan to give that 

here in the hospital prior to his discharge.  I will draw a Trileptal level 

tomorrow morning.  This was essentially 0 upon admission.  Vital signs reviewed 

they're within normal limits.  We will monitor him for safety and encourage 

participation in the milieu.

## 2018-08-24 RX ADMIN — ACETAMINOPHEN PRN MG: 325 TABLET, FILM COATED ORAL at 12:15

## 2018-08-24 RX ADMIN — ACETAMINOPHEN PRN MG: 325 TABLET, FILM COATED ORAL at 05:21

## 2018-08-24 RX ADMIN — ACETAMINOPHEN PRN MG: 325 TABLET, FILM COATED ORAL at 16:19

## 2018-08-24 RX ADMIN — PALIPERIDONE SCH MG: 6 TABLET, EXTENDED RELEASE ORAL at 09:05

## 2018-08-24 RX ADMIN — ACETAMINOPHEN PRN MG: 325 TABLET, FILM COATED ORAL at 00:30

## 2018-08-24 RX ADMIN — NICOTINE SCH PATCH: 21 PATCH, EXTENDED RELEASE TRANSDERMAL at 09:04

## 2018-08-24 RX ADMIN — ACETAMINOPHEN PRN MG: 325 TABLET, FILM COATED ORAL at 20:00

## 2018-08-24 NOTE — P.PN
Progress Note - Text





Interval history: The patient is found in the hallway he follows me to an 

interview room.  He continues to have some difficulty with sleep at night staff 

report that he can be irritable at times.  He needs to demonstrate some 

pressured speech.  He states numerous times is not bipolar.  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  Although he does not agree with the 

hospitalization or are diagnosis he is willing to comply with treatment.





Mental status exam: The patient is a  male appearing his stated age he 

has long hair and he is wearing that in a ponytail today.  He is dressed in his 

own clothing wearing 2 layers.  He continues to be malodorous.  Speech is 

fluent spontaneous pressured.  He does briefly follow direction and then he 

becomes tangential again and demonstrates loose associations flight of ideas.  

He continues to demonstrate grandiose thinking some of which is Restorationist in 

nature area insight and judgment remain impaired.  He demonstrates no verbal or 

physical aggressiveness.  He demonstrates no abnormal involuntary movements.  

He reports no suicidal or homicidal ideation intent or plan.





Plan: The patient will continue on his current psychotropic medications.  He 

will be due for the second Invega Sustenna injection Monday.  We are awaiting 

the results of the Trileptal level as we will consider titrating that 

medication further.  He continues to demonstrate symptoms of coleen and 

psychosis and requires continued hospitalization.  We would expect him to be 

unsafe and decompensate if discharged at this time.  Vital signs reviewed.

## 2018-08-25 RX ADMIN — PALIPERIDONE SCH MG: 6 TABLET, EXTENDED RELEASE ORAL at 08:33

## 2018-08-25 RX ADMIN — ACETAMINOPHEN PRN MG: 325 TABLET, FILM COATED ORAL at 01:51

## 2018-08-25 RX ADMIN — ACETAMINOPHEN PRN MG: 325 TABLET, FILM COATED ORAL at 16:05

## 2018-08-25 RX ADMIN — NICOTINE SCH PATCH: 21 PATCH, EXTENDED RELEASE TRANSDERMAL at 08:33

## 2018-08-25 RX ADMIN — ACETAMINOPHEN PRN MG: 325 TABLET, FILM COATED ORAL at 23:58

## 2018-08-25 NOTE — P.PN
Progress Note - Text


Progress Note Date: 08/25/18





Interval History: Patient is a 58-year-old male who is being seen in coverage 

for Dr. Joshua.  Patient presents and states that he is sleeping and attending 

only groups that don't teach him anxiety or disease.  Patient states that he 

eating well and states he has no side effects from the medication.  Patient had 

no complaints at this time.





Mental Status: Appearance/Attitude: Patient is dressed in layers of clothing, 

makes intermittent eye contact and is cooperative.


Behavior: Patient does not display any psychomotor agitation or retardation.


Speech/Language: Patient's speech is pressured, he is coherent


Thought Process: Patient initially began the interview was goal-directed 

responses longer the interview progresses the patient becomes more tangential


Thought Content: Patient denies any auditory or visual hallucinations and 

continues to exhibit delusional ideation, the longer the interview progresses 

the more disorganized he becomes.  Patient talked about not attending groups 

that teach anxiety or disease went on to discuss that he doesn't have an 

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  

Patient states he slept well last night and his appetite is good.


Suicidal/Homicidal Ideation: Patient denies any current suicidal or homicidal 

ideation.


Sensorium/Cognition: Patient is alert and oriented to person, place, time


Mood/Affect: Patient's mood is expansive his affect is appropriate to his mood


Insight/Judgment: Patient's insight and judgment are fair





Assessment: Patient was seen today was dressed in layers of clothing, his 

speech is pressured patient states he sleeping hours document the patient is 

only sleeping 4 hours a night.  Patient is attending some groups or activities 

and continues to express delusional ideation.  Patient is also taking his 

medications and reported no side effects from them.





Plan: Patient continues on Invega 6 mg daily, Trileptal 3 mg twice a day and is 

due for his next long-acting injection of Invega on Monday.  Patient continues 

to require hospitalization to further target his mood disorder and psychotic 

symptoms.

## 2018-08-26 RX ADMIN — ACETAMINOPHEN PRN MG: 325 TABLET, FILM COATED ORAL at 05:04

## 2018-08-26 RX ADMIN — NICOTINE SCH PATCH: 21 PATCH, EXTENDED RELEASE TRANSDERMAL at 08:48

## 2018-08-26 RX ADMIN — NICOTINE SCH PATCH: 21 PATCH, EXTENDED RELEASE TRANSDERMAL at 16:26

## 2018-08-26 RX ADMIN — PALIPERIDONE SCH MG: 6 TABLET, EXTENDED RELEASE ORAL at 08:48

## 2018-08-26 RX ADMIN — ACETAMINOPHEN PRN MG: 325 TABLET, FILM COATED ORAL at 12:12

## 2018-08-26 RX ADMIN — ACETAMINOPHEN PRN MG: 325 TABLET, FILM COATED ORAL at 16:18

## 2018-08-26 RX ADMIN — ACETAMINOPHEN PRN MG: 325 TABLET, FILM COATED ORAL at 23:41

## 2018-08-26 NOTE — P.PN
Progress Note - Text


Progress Note Date: 08/26/18


Interval History: Patient is a 58-year-old male seen in coverage for Dr. Joshua.

  Patient states that he is doing fine but complains of pain and wanted to know 

if he could have Vicodin or Lortabs.  Patient states that he sleeping and 

eating well, states he slept for about 5 hours last night.  Patient reports 

that he is not hearing voices and has been attending groups and activities.  He 

denied any paranoid ideation.  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

".  Patient had no other concerns at this time other than that he wondered if 

he needed to continue taking Trileptal.





Mental Status: Appearance/Attitude: Patient is dressed in layers of clothing, 

makes eye contact and is cooperative.


Behavior: Patient does not exhibit any psychomotor agitation or retardation.


Speech/Language: Patient's speech is spontaneous of normal volume and rhythm 

and becomes slightly pressured


Thought Process: Patient is goal-directed initially becomes more tangential the 

longer the interview goes on


Thought Content: Patient denies auditory or visual hallucinations and no 

paranoid delusions are elicited.  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  Patient states he slept about 

5 hours last night it's documented that he slept for 4.  Patient reports he is 

attending groups and activities.


Suicidal/Homicidal Ideation: Denies any suicidal or homicidal ideation at this 

time


Sensorium/Cognition: Patient is alert and oriented to person, place, and time


Mood/Affect: Patient's mood is stable and his affect is slightly blunted


Insight/Judgment: Patient's insight and judgment are fair





Assessment: patient was requesting pain medication today stating that the 

Tylenol is not sufficient.  He also wondered if he would need to continue 

taking Trileptal once he had the injections of Invega.  Patient's been sleeping 

about 4 hours a night.  Patient is seen pacing in the hallway interacting with 

multiple other peers.  Patient states he's attending groups or activities.  

Patient again was making delusional comments about being a  and EMT 

that he is here to preserve life.





Plan: patient continues on Invega 6 mg daily and Trileptal 300 mg twice a day 

and will receive his second injection of long-acting Invega tomorrow.  Patient 

continues to require hospitalization to stabilize his mood.

## 2018-08-27 RX ADMIN — NICOTINE SCH PATCH: 21 PATCH, EXTENDED RELEASE TRANSDERMAL at 09:16

## 2018-08-27 RX ADMIN — ACETAMINOPHEN PRN MG: 325 TABLET, FILM COATED ORAL at 21:29

## 2018-08-27 RX ADMIN — ACETAMINOPHEN PRN MG: 325 TABLET, FILM COATED ORAL at 15:03

## 2018-08-27 RX ADMIN — ACETAMINOPHEN PRN MG: 325 TABLET, FILM COATED ORAL at 05:37

## 2018-08-27 RX ADMIN — PALIPERIDONE SCH MG: 6 TABLET, EXTENDED RELEASE ORAL at 09:16

## 2018-08-27 RX ADMIN — ACETAMINOPHEN PRN MG: 325 TABLET, FILM COATED ORAL at 09:16

## 2018-08-27 NOTE — P.PN
Progress Note - Text





Interval history: The patient is found in the hallway he follows me to an 

interview room.  He approaches me and requests to speak.  He states he is ready 

for his second invega injection.  We discussed that medication again his 

questions were answered.  The Trileptal level is not yet available.  He states 

that he has read all of the chemistry books and discusses a variety of 

nonrelevant topics.





Mental status exam: The patient's pleasant he is directable he has spontaneous 

pressured speech.  He continues to spontaneously describes several grandiose 

thoughts.  He is demonstrative with speech and makes several hand gestures 

while he speaks but nothing consistent with a conventional form of 

communication.  Thought process is tangential he demonstrates loose 

associations.  He demonstrates no verbal or physical aggressiveness he 

demonstrates no abnormal involuntary movements.  He denies having any suicidal 

or homicidal thoughts.  Insight and judgment remains quite limited.





Plan: The patient will continue on his current medication we will give him the 

next dose of Invega Sustenna 156 mg IM, we are awaiting a Trileptal level.  We 

will continue to monitor him for safety and encourage appropriate participation 

in the milieu.  Vital signs reviewed.

## 2018-08-28 RX ADMIN — PALIPERIDONE SCH MG: 6 TABLET, EXTENDED RELEASE ORAL at 09:04

## 2018-08-28 RX ADMIN — ACETAMINOPHEN PRN MG: 325 TABLET, FILM COATED ORAL at 14:57

## 2018-08-28 RX ADMIN — NICOTINE SCH PATCH: 21 PATCH, EXTENDED RELEASE TRANSDERMAL at 09:04

## 2018-08-28 RX ADMIN — ACETAMINOPHEN PRN MG: 325 TABLET, FILM COATED ORAL at 02:58

## 2018-08-28 RX ADMIN — ACETAMINOPHEN PRN MG: 325 TABLET, FILM COATED ORAL at 11:01

## 2018-08-28 NOTE — P.PN
Progress Note - Text





Interval history: The patient is found in the hallway ambulating he follows me 

to an interview room.  He states that his mood is wonderful.  He was observed 

singing out loud prior to us speaking.  Staff report that the patient has been 

demonstrating a very labile affect.  He has demonstrated laughter and crying 

older and a short period of time.  The Trileptal level finally came back which 

was subtherapeutic at 1.8.  In discussing this with the patient he admits to 

cheeking the medicine and not taking it except for 2 doses prior to the blood 

test.  We reviewed the purpose of the medication.  He described concerns he had 

with Trileptal as an outpatient.  We discussed alternatives such as Depakote.





Mental status exam: The patient is alert he continues to display symptoms of 

coleen and psychosis.  He reports grandiose thoughts Adventism preoccupation.  

He demonstrates a disorganized thought process including flight of ideas.  He 

is somewhat pressured in speech.  He can be briefly redirected.  He is 

cooperative he demonstrates no agitated or aggressive behavior during our 

session.  Insight and judgment are impaired.  He continues to ask me to remove 

the bipolar diagnosis from his chart.  He reports no suicidal or homicidal 

thoughts.  It appears he has been showering.  Grooming is adequate.





Plan: The patient will continue on his current psychotropic medications we will 

discontinue the Trileptal and initiate Depakote ER 1000 mg at bedtime.  We will 

monitor for cheeking behavior.  He is encouraged to appropriately participate 

in the milieu we will monitor him for safety.  We will monitor vital signs.  He 

requires continued hospitalization for his symptoms of coleen and psychosis.

## 2018-08-29 RX ADMIN — PALIPERIDONE SCH MG: 6 TABLET, EXTENDED RELEASE ORAL at 07:58

## 2018-08-29 RX ADMIN — ACETAMINOPHEN PRN MG: 325 TABLET, FILM COATED ORAL at 08:49

## 2018-08-29 RX ADMIN — DIVALPROEX SODIUM SCH MG: 250 TABLET, FILM COATED, EXTENDED RELEASE ORAL at 20:01

## 2018-08-29 RX ADMIN — NICOTINE SCH PATCH: 21 PATCH, EXTENDED RELEASE TRANSDERMAL at 07:58

## 2018-08-29 RX ADMIN — ACETAMINOPHEN PRN MG: 325 TABLET, FILM COATED ORAL at 20:02

## 2018-08-29 NOTE — P.PN
Progress Note - Text





Interval history: The patient is found in group he follows me to an interview 

room.  The patient's states his mood is good.  He does quickly go into a 

request that I remove the bipolar diagnosis from his chart.  We discussed his 

diagnosis in some detail.  He becomes upset with this topic but was not 

threatening or aggressive.  He continues to demonstrate symptoms of coleen and 

psychosis.  Staff report that he was less intrusive in group today.  We 

discussed the recent addition of the Depakote.  He is requesting that we use a 

smaller pill of possible and we will see if pharmacy can provide that.





Mental status exam: The patient is alert he seated in his chair.  He has his 

hair braided.  He is dressed in his own clothing.  Eye contact is appropriate 

speech is fluent spontaneous pressured he is verbose.  He states he is an 

 he reports that he made his first million dollars before the age 

of 18.  He indicates that he accomplishes more in 5 minutes than I do all day.  

He ventilates feelings of frustration regarding the hospitalization.  Insight 

and judgment impaired.  He continues to demonstrate symptoms of coleen and 

psychosis.  Delusional thoughts are mainly grandiose in nature today.  He does 

describe some persecutory thinking.  He demonstrates no abnormal involuntary 

movements.  Affect is labile.





Plan: The patient will continue his current medication, I am able to 

discontinue the oral invega at this time now that he has received both 

injections of Invega Sustenna.  We will continue the Depakote ER and see if 

pharmacy is able to provide the 250 mg tablets to equal to 1000 mg dose at 

bedtime.  Alternatively we could use the Depakene liquid if necessary.  We will 

continue to monitor him for safety he is encouraged to appropriately 

participate in the milieu.  Vital signs reviewed.

## 2018-08-30 RX ADMIN — DIVALPROEX SODIUM SCH MG: 250 TABLET, FILM COATED, EXTENDED RELEASE ORAL at 20:54

## 2018-08-30 RX ADMIN — IBUPROFEN PRN MG: 600 TABLET ORAL at 14:16

## 2018-08-30 RX ADMIN — ACETAMINOPHEN PRN MG: 325 TABLET, FILM COATED ORAL at 05:15

## 2018-08-30 RX ADMIN — NICOTINE SCH PATCH: 21 PATCH, EXTENDED RELEASE TRANSDERMAL at 09:00

## 2018-08-30 NOTE — P.PN
Progress Note - Text





Interval history: The patient is found in group he follows me to an interview 

room.  He reports his mood is stable.  Staff reported that he slept 6 hours 

last evening.  He has been eating.  He reports compliance with the Depakote.  

His questions regarding the medication were answered.  Vital signs reviewed.  

He continues to speak on a variety of topics several are still delusional.  He 

states that titanium was grafted to his bones.  He continues to describe 

thoughts of being a multimillionaire and an .





Mental status exam: The patient is alert he is dressed in his own clothing he 

seated calmly.  He is pleasant and cooperative.  Speech is spontaneous 

pressured he is verbose.  He continues to demonstrate tangential thinking and 

loose associations.  Insight into his symptoms is quite limited.  He reports no 

suicidal or homicidal ideation.  He endorses no hallucinations.  He continues 

to have grandiose thinking some persecutory thinking and Mandaen 

preoccupation.  Affect is expansive.  He demonstrates no verbal or physical 

aggressiveness.  He demonstrates no abnormal involuntary movements.





Plan: The patient will continue on his current medication.  We are allowing the 

invega and the Depakote time to demonstrate efficacy.  We will plan to draw the 

Depakote level Monday morning area he requires continued hospitalization due to 

his symptoms of coleen and psychosis.  We are aware that he does have symptoms 

at baseline but we are hoping to improve the severity of manic symptoms prior 

to discharge.  We will continue to monitor for safety.

## 2018-08-31 RX ADMIN — IBUPROFEN PRN MG: 600 TABLET ORAL at 21:04

## 2018-08-31 RX ADMIN — IBUPROFEN PRN MG: 600 TABLET ORAL at 09:18

## 2018-08-31 RX ADMIN — IBUPROFEN PRN MG: 600 TABLET ORAL at 00:09

## 2018-08-31 RX ADMIN — NICOTINE SCH PATCH: 21 PATCH, EXTENDED RELEASE TRANSDERMAL at 09:18

## 2018-08-31 RX ADMIN — DIVALPROEX SODIUM SCH MG: 250 TABLET, FILM COATED, EXTENDED RELEASE ORAL at 21:02
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
## 2018-09-01 VITALS — RESPIRATION RATE: 18 BRPM

## 2018-09-01 RX ADMIN — ACETAMINOPHEN PRN MG: 325 TABLET, FILM COATED ORAL at 14:04

## 2018-09-01 RX ADMIN — IBUPROFEN PRN MG: 600 TABLET ORAL at 08:50

## 2018-09-01 RX ADMIN — DIVALPROEX SODIUM SCH MG: 250 TABLET, FILM COATED, EXTENDED RELEASE ORAL at 20:45

## 2018-09-01 RX ADMIN — NICOTINE SCH PATCH: 21 PATCH, EXTENDED RELEASE TRANSDERMAL at 08:50

## 2018-09-01 RX ADMIN — IBUPROFEN PRN MG: 600 TABLET ORAL at 17:57

## 2018-09-01 NOTE — P.PN
Progress Note - Text


Progress Note Date: 09/01/18





IDENTIFICATION DATA 58-year-old male with history of bipolar disorder admitted 

to the mental health unit on a petition for acute symptoms of psychosis.


INTERVAL HISTORY:  He is grandiose, delusional,  Stated he is aleshia . He 

reports talking to his cat. His speech is pressured with flight of ideas. He 

states his main problems are PTSD AND ADHD and everything else is a lie. He 

stated for the past 15 years he has been saying the same things but no one 

believes him. He reports he is going to manjeet every one so he can regain 

everything he had lost. 


MENTAL STATUS EXAMINATION: 


Patient is 58 year old man appears in fair marginal grooming and hygiene. His 

speech and thought process are pressured with flight of ideas.  mood is elated 

and affect  appropriate. he denies auditory or visual hallucinations. denies  

paranoia.  He is alert and oriented X 4. His insight and judgment are limited. 

He denies current suicidal  or homicidal ideations. 


ASSESSMENT AND PLAN: Continue  current treatment.

## 2018-09-02 RX ADMIN — IBUPROFEN PRN MG: 600 TABLET ORAL at 20:31

## 2018-09-02 RX ADMIN — NICOTINE SCH PATCH: 21 PATCH, EXTENDED RELEASE TRANSDERMAL at 08:34

## 2018-09-02 RX ADMIN — IBUPROFEN PRN MG: 600 TABLET ORAL at 06:46

## 2018-09-02 RX ADMIN — DIVALPROEX SODIUM SCH MG: 250 TABLET, FILM COATED, EXTENDED RELEASE ORAL at 20:28

## 2018-09-02 RX ADMIN — ACETAMINOPHEN PRN MG: 325 TABLET, FILM COATED ORAL at 18:55

## 2018-09-02 RX ADMIN — IBUPROFEN PRN MG: 600 TABLET ORAL at 16:09

## 2018-09-02 NOTE — P.PN
Progress Note - Text


Progress Note Date: 09/02/18





IDENTIFICATION DATA 58-year-old male with history of bipolar disorder admitted 

to the mental health unit on a petition for acute symptoms of psychosis.


INTERVAL HISTORY:  He does not think he needs medications but takes them. He 

stated every thing that is said about him is a lie. He did not talk much today.

  he reports good sleep  and appetite. He denies most of the symptoms.  He 

denies current symptoms of psychosis, coleen or depression. He reports going to 

all his groups. No behavioral problems reported. 


MENTAL STATUS EXAMINATION: 


Patient is 58 year old man appears in fair  grooming and hygiene. His speech 

and thought process are purposefully limited and stated I told you every thing 

yesterday.  mood is euthymic and affect  appropriate. he denies auditory or 

visual hallucinations. denies  paranoia.  He is alert and oriented X 4. His 

insight and judgment are limited. He denies current suicidal  or homicidal 

ideations. 


ASSESSMENT AND PLAN: Continue  current treatment.

## 2018-09-03 VITALS — TEMPERATURE: 97.5 F

## 2018-09-03 LAB
ALT SERPL-CCNC: 37 U/L (ref 21–72)
AST SERPL-CCNC: 31 U/L (ref 17–59)

## 2018-09-03 RX ADMIN — DIVALPROEX SODIUM SCH MG: 250 TABLET, FILM COATED, EXTENDED RELEASE ORAL at 20:01

## 2018-09-03 RX ADMIN — ACETAMINOPHEN PRN MG: 325 TABLET, FILM COATED ORAL at 09:05

## 2018-09-03 RX ADMIN — IBUPROFEN PRN MG: 600 TABLET ORAL at 21:54

## 2018-09-03 RX ADMIN — ACETAMINOPHEN PRN MG: 325 TABLET, FILM COATED ORAL at 04:18

## 2018-09-03 RX ADMIN — IBUPROFEN PRN MG: 600 TABLET ORAL at 04:18

## 2018-09-03 RX ADMIN — NICOTINE SCH PATCH: 21 PATCH, EXTENDED RELEASE TRANSDERMAL at 09:02

## 2018-09-03 RX ADMIN — IBUPROFEN PRN MG: 600 TABLET ORAL at 12:31

## 2018-09-03 NOTE — P.PN
Progress Note - Text





Interval history: The patient is found in the hallway he follows me to an 

interview room.  He reports that his mood is good.  He does feel tired as he 

didn't sleep well last night.  He's been compliant with his medication.  

Depakote level was drawn this morning.  That value is not yet available but his 

ever enzymes are within normal limits.  He has been attending groups.  Staff 

report that he is appearing less manic.





Mental status exam: The patient is an alert  male appearing his stated 

age.  He is dressed in his own clothing.  He is wearing 2-3 shirts and is 

wearing shorts over his jeans.  Eye contact is appropriate.  Speech is much 

less pressured he maintains a constricted affect.  His thought process is more 

linear today he demonstrates no tangential thinking loose associations or 

flight of ideas.  He denies having auditory or visual hallucinations.  He does 

have residual grandiose thinking.  Insight and judgment improving.  He 

demonstrates no verbal or physical aggressiveness he demonstrates no abnormal 

involuntary movements.





Plan: The patient will continue on his current medication.  We will await the 

results of the Depakote level.  He may be appropriate for discharge in the next 

1-2 days.  Vital signs reviewed.

## 2018-09-04 VITALS — DIASTOLIC BLOOD PRESSURE: 80 MMHG | SYSTOLIC BLOOD PRESSURE: 113 MMHG | HEART RATE: 69 BPM

## 2018-09-04 RX ADMIN — ACETAMINOPHEN PRN MG: 325 TABLET, FILM COATED ORAL at 06:31

## 2018-09-04 RX ADMIN — IBUPROFEN PRN MG: 600 TABLET ORAL at 05:28

## 2018-09-04 RX ADMIN — NICOTINE SCH PATCH: 21 PATCH, EXTENDED RELEASE TRANSDERMAL at 09:06
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
